# Patient Record
Sex: MALE | Race: WHITE | Employment: OTHER | URBAN - METROPOLITAN AREA
[De-identification: names, ages, dates, MRNs, and addresses within clinical notes are randomized per-mention and may not be internally consistent; named-entity substitution may affect disease eponyms.]

---

## 2022-06-20 ENCOUNTER — PROCEDURE VISIT (OUTPATIENT)
Dept: UROLOGY | Age: 87
End: 2022-06-20
Payer: OTHER GOVERNMENT

## 2022-06-20 DIAGNOSIS — R33.9 URINARY RETENTION: Primary | ICD-10-CM

## 2022-06-20 DIAGNOSIS — C61 PROSTATE CANCER (HCC): ICD-10-CM

## 2022-06-20 DIAGNOSIS — N31.2 NEUROGENIC BLADDER, FLACCID: ICD-10-CM

## 2022-06-20 PROCEDURE — 52000 CYSTOURETHROSCOPY: CPT | Performed by: UROLOGY

## 2022-06-20 PROCEDURE — 1123F ACP DISCUSS/DSCN MKR DOCD: CPT | Performed by: UROLOGY

## 2022-06-20 PROCEDURE — 99214 OFFICE O/P EST MOD 30 MIN: CPT | Performed by: UROLOGY

## 2022-06-20 RX ORDER — AMIODARONE HYDROCHLORIDE 200 MG/1
TABLET ORAL
COMMUNITY
Start: 2022-03-18

## 2022-06-20 RX ORDER — FAMOTIDINE 20 MG/1
20 TABLET, FILM COATED ORAL 2 TIMES DAILY
COMMUNITY

## 2022-06-20 RX ORDER — SACUBITRIL AND VALSARTAN 24; 26 MG/1; MG/1
1 TABLET, FILM COATED ORAL 2 TIMES DAILY
COMMUNITY

## 2022-06-20 RX ORDER — METOPROLOL SUCCINATE 50 MG/1
50 TABLET, EXTENDED RELEASE ORAL DAILY
COMMUNITY

## 2022-06-20 RX ORDER — B-COMPLEX WITH VITAMIN C
1 TABLET ORAL DAILY
COMMUNITY

## 2022-06-20 RX ORDER — POTASSIUM CHLORIDE 750 MG/1
10 TABLET, FILM COATED, EXTENDED RELEASE ORAL DAILY
COMMUNITY
Start: 2022-05-05

## 2022-06-20 RX ORDER — ASCORBIC ACID 500 MG
500 TABLET ORAL DAILY
COMMUNITY

## 2022-06-20 RX ORDER — TRAZODONE HYDROCHLORIDE 50 MG/1
25 TABLET ORAL
COMMUNITY

## 2022-06-20 RX ORDER — LEVOTHYROXINE SODIUM 0.03 MG/1
25 TABLET ORAL
COMMUNITY
Start: 2022-03-15

## 2022-06-20 RX ORDER — SPIRONOLACTONE 25 MG/1
12.5 TABLET ORAL DAILY
COMMUNITY
Start: 2022-06-08 | End: 2022-07-08

## 2022-06-20 RX ORDER — FERROUS SULFATE 325(65) MG
TABLET ORAL
COMMUNITY
Start: 2022-03-18

## 2022-06-20 RX ORDER — LISINOPRIL 5 MG/1
TABLET ORAL
COMMUNITY
Start: 2022-03-18

## 2022-06-20 NOTE — PROGRESS NOTES
[facility]  [address] [address] [address]  [phone]    [facility]  : 1935         HPI   82 y.o., male presents for cystoscopy     The patient has had a catheter in since April. He was seen in our office and given a trial of voiding but he had to return later that afternoon to have a Yousif catheter placed. The catheter itself has been changed out several times. There is a history of prostate cancer and apparently he had radiation as well as a TURP. The patient informs me that he has had multiple PSAs at the [facility] and these have been acceptable. He has not had any fever recently. Past Medical History:   Diagnosis Date    Hypertension     Prostate CA [facility])     Thyroid disease      No past surgical history on file.   Current Outpatient Medications   Medication Sig Dispense Refill    amiodarone (CORDARONE) 200 MG tablet TAKE TWO TABLETS BY MOUTH TWICE DAILY FOR SIX DAYS, THEN ONE TABLET TWICE DAILY FOR 7 DAYS, THEN ONE TABLET BY MOUTH EVERY DAY      apixaban (ELIQUIS) 2.5 MG TABS tablet Take 2.5 mg by mouth 2 times daily      ascorbic acid (VITAMIN C) 500 MG tablet Take 500 mg by mouth daily      Calcium Carbonate-Vitamin D (OYSTER SHELL CALCIUM/D) 500-200 MG-UNIT TABS Take 1 tablet by mouth daily      famotidine (PEPCID) 20 MG tablet Take 20 mg by mouth 2 times daily      FEROSUL 325 (65 Fe) MG tablet TAKE ONE TABLET BY MOUTH EVERY DAY FOR FOURTEEN DAYS      levothyroxine (SYNTHROID) 25 MCG tablet Take 25 mcg by mouth every morning (before breakfast)      lisinopril (PRINIVIL;ZESTRIL) 5 MG tablet TAKE ONE TABLET BY MOUTH EVERY DAY AS DIRECTED      metoprolol succinate (TOPROL XL) 50 MG extended release tablet Take 50 mg by mouth daily      potassium chloride (KLOR-CON) 10 MEQ extended release tablet Take 10 mEq by mouth daily      sacubitril-valsartan (ENTRESTO) 24-26 MG per tablet Take 1 tablet by mouth 2 times daily      spironolactone (ALDACTONE) 25 MG tablet Take 12.5 mg by mouth daily      traZODone (DESYREL) 50 MG tablet Take 25 mg by mouth       No current facility-administered medications for this visit. No Known Allergies  Social History     Socioeconomic History    Marital status:      Spouse name: Not on file    Number of children: Not on file    Years of education: Not on file    Highest education level: Not on file   Occupational History    Not on file   Tobacco Use    Smoking status: Former Smoker     Packs/day: 0.50    Smokeless tobacco: Never Used   Substance and Sexual Activity    Alcohol use: Not Currently    Drug use: Not on file    Sexual activity: Not on file   Other Topics Concern    Not on file   Social History Narrative    Not on file     Social Determinants of Health     Financial Resource Strain:     Difficulty of Paying Living Expenses: Not on file   Food Insecurity:     Worried About [address] [employer] in the Last Year: Not on file    Ran Out of Food in the Last Year: Not on file   Transportation Needs:     Lack of Transportation (Medical): Not on file    Lack of Transportation (Non-Medical):  Not on file   Physical Activity:     Days of Exercise per Week: Not on file    Minutes of Exercise per Session: Not on file   Stress:     Feeling of Stress : Not on file   Social Connections:     Frequency of Communication with Friends and Family: Not on file    Frequency of Social Gatherings with Friends and Family: Not on file    Attends [employer] Services: Not on file    Active Member of Clubs or Organizations: Not on file    Attends Club or Organization Meetings: Not on file    Marital Status: Not on file   Intimate Partner Violence:     Fear of Current or Ex-Partner: Not on file    Emotionally Abused: Not on file    Physically Abused: Not on file    Sexually Abused: Not on file   Housing Stability:     Unable to Pay for Housing in the Last Year: Not on file    Number of [place] in the Last Year: Not on file    Unstable Housing in the Last Year: Not on file     No family history on file. There were no vitals taken for this visit. Cystoscopy Procedure:     Procedure Room # 1   Scope ID: Disposable   Assistant: maru      All risks, benefits and alternatives were again reviewed with patient and he is willing to proceed at this time. His genital area was prepped and draped and a sterile field applied. 2% lidocaine jelly was injected in the the urethra and allowed to dwell for several minutes. A flexible cystoscope was then inserted into the urethral meatus and advanced under direct vision. The anterior and posterior urethra appeared normal in appearance. The prostatic fossa was status post TURP and open. There was a significant amount of erythema of the mucosa within the prostatic fossa and within the bladder itself consistent with long-term catheter use. At the ureteral orifices were seen in their normal orthotopic position. The cystoscope was then removed under direct vision. The patient tolerated the procedure well. Assessment and Plan    ICD-10-CM    1. Urinary retention  R33.9 CYSTOURETHROSCOPY (29997)   2. Prostate cancer ([facility] [address]  C61    3. 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 We will put his catheter back in and I will have him back to the main office in the near future to have the nurses teach him how to do self intermittent catheterization. The patient will perform self intermittent cath 4 times a day.

## 2022-06-30 ENCOUNTER — NURSE ONLY (OUTPATIENT)
Dept: UROLOGY | Age: 87
End: 2022-06-30

## 2022-06-30 DIAGNOSIS — R33.9 URINARY RETENTION: Primary | ICD-10-CM

## 2022-06-30 DIAGNOSIS — N31.2 NEUROGENIC BLADDER, FLACCID: ICD-10-CM

## 2022-06-30 NOTE — PROGRESS NOTES
Patient came in today for clean intermittent self catheterization education. Patient was shown a video explaining the basic techniques of CIC. They showed an adequate understanding of CIC technique and did perform it successfully in office prior to leaving. Patient was given a box of 14f Coloplast Speedicath Soft catheters, and an order will be placed to [facility] for delivery.

## 2022-07-11 ENCOUNTER — TELEPHONE (OUTPATIENT)
Dept: UROLOGY | Age: 87
End: 2022-07-11

## 2022-07-11 NOTE — TELEPHONE ENCOUNTER
Spoke with patient's daughter. More samples of catheters given to hold patient over until [address] completes the OV note for the prescription.

## 2022-08-02 ENCOUNTER — TELEPHONE (OUTPATIENT)
Dept: UROLOGY | Age: 87
End: 2022-08-02

## 2022-08-02 NOTE — TELEPHONE ENCOUNTER
Pt's daughter Magdalene Mclaughlins calling about prescription for his cath supplies. Fatemeh Yadav gave him some samples in early July. She was told if they have not heard anything to call back.

## 2022-09-13 ENCOUNTER — OFFICE VISIT (OUTPATIENT)
Dept: UROLOGY | Age: 87
End: 2022-09-13
Payer: OTHER GOVERNMENT

## 2022-09-13 DIAGNOSIS — N31.2 NEUROGENIC BLADDER, FLACCID: ICD-10-CM

## 2022-09-13 DIAGNOSIS — R33.9 URINARY RETENTION: Primary | ICD-10-CM

## 2022-09-13 DIAGNOSIS — C61 PROSTATE CANCER (HCC): ICD-10-CM

## 2022-09-13 PROCEDURE — 1123F ACP DISCUSS/DSCN MKR DOCD: CPT | Performed by: NURSE PRACTITIONER

## 2022-09-13 PROCEDURE — 99214 OFFICE O/P EST MOD 30 MIN: CPT | Performed by: NURSE PRACTITIONER

## 2022-09-13 ASSESSMENT — ENCOUNTER SYMPTOMS
NAUSEA: 0
BACK PAIN: 0

## 2022-09-13 NOTE — PROGRESS NOTES
[address], [address]  [phone]          Maira Ramirez  : 1935    Chief Complaint   Patient presents with    Urinary Retention          HPI     Maira Ramirez is a 82 y.o. male  Patient here after an ER visit. Patient has history of neurogenic flaccid bladder. He underwent a TURP hoping that this would help him empty the bladder. Fortunately he continued to have elevated PVRs. He has significant history of prostate cancer treated with radiation. As PSA blood test done at the [facility] they have been undetectable. At his last visit he was taught how to CIC. He reports that overall he has been doing well up until recently. 2 weeks ago he was unable to get the catheter in the bladder. He had little bit of bleeding and this frightened him so he went to the ER. They placed a permanent indwelling catheter. He is here today to discuss removing this. He is accompanied by his daughter. His daughter tells me that he was very anxious and nervous when he saw blood and could not get the catheter again. Past Medical History:   Diagnosis Date    Hypertension     Prostate CA ([address]     Thyroid disease      No past surgical history on file.   Current Outpatient Medications   Medication Sig Dispense Refill    amiodarone (CORDARONE) 200 MG tablet TAKE TWO TABLETS BY MOUTH TWICE DAILY FOR SIX DAYS, THEN ONE TABLET TWICE DAILY FOR 7 DAYS, THEN ONE TABLET BY MOUTH EVERY DAY      apixaban (ELIQUIS) 2.5 MG TABS tablet Take 2.5 mg by mouth 2 times daily      ascorbic acid (VITAMIN C) 500 MG tablet Take 500 mg by mouth daily      Calcium Carbonate-Vitamin D (OYSTER SHELL CALCIUM/D) 500-200 MG-UNIT TABS Take 1 tablet by mouth daily      famotidine (PEPCID) 20 MG tablet Take 20 mg by mouth 2 times daily      FEROSUL 325 (65 Fe) MG tablet TAKE ONE TABLET BY MOUTH EVERY DAY FOR FOURTEEN DAYS      levothyroxine (SYNTHROID) 25 MCG tablet Take 25 mcg by mouth every morning (before breakfast)      lisinopril (PRINIVIL;ZESTRIL) 5 MG tablet TAKE ONE TABLET BY MOUTH EVERY DAY AS DIRECTED      metoprolol succinate (TOPROL XL) 50 MG extended release tablet Take 50 mg by mouth daily      potassium chloride (KLOR-CON) 10 MEQ extended release tablet Take 10 mEq by mouth daily      sacubitril-valsartan (ENTRESTO) 24-26 MG per tablet Take 1 tablet by mouth 2 times daily      traZODone (DESYREL) 50 MG tablet Take 25 mg by mouth      spironolactone (ALDACTONE) 25 MG tablet Take 12.5 mg by mouth daily       No current facility-administered medications for this visit. No Known Allergies  Social History     Socioeconomic History    Marital status:      Spouse name: Not on file    Number of children: Not on file    Years of education: Not on file    Highest education level: Not on file   Occupational History    Not on file   Tobacco Use    Smoking status: Former     Packs/day: 0.50     Types: Cigarettes    Smokeless tobacco: Never   Substance and Sexual Activity    Alcohol use: Not Currently    Drug use: Not on file    Sexual activity: Not on file   Other Topics Concern    Not on file   Social History Narrative    Not on file     Social Determinants of Health     Financial Resource Strain: Not on file   Food Insecurity: Not on file   Transportation Needs: Not on file   Physical Activity: Not on file   Stress: Not on file   Social Connections: Not on file   Intimate Partner Violence: Not on file   Housing Stability: Not on file     No family history on file. Review of Systems  Constitutional:   Negative for fever. GI:  Negative for nausea. Musculoskeletal:  Negative for back pain.     PHYSICAL EXAM    GENERAL: No acute distress, Awake, Alert, Oriented X 3, Gait normal  ABDOMEN: soft, non tender, non-distended, positive bowel sounds, no organomegaly, no palpable masses, no guarding, no rebound tenderness  SKIN: No rash, no erythema, no lacerations or abrasions, no ecchymosis  MUSCULOSKELETAL - MAEW, no edema   -indwelling catheter is intact. Urine is ced in color. Assessment and Plan    ICD-10-CM    1. Urinary retention  R33.9       2. 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 Prostate cancer [facility])  C61         PLAN:  He would like to take the catheter out and continue CIC. He knows to do this 3-4 times a day to completely empty the bladder. I explained to him that if he has issues getting the catheter and to call our office and we will be glad to see him. He tells me at the time he went to the ER he was not uncomfortable and probably could have waited till morning to come in for us to assist him. He has been using them for 12 Romanian straight tip catheters. I will also give him samples of daily catheters to see if this helps him insert easier. He has a follow-up in a few weeks he will keep that follow-up. Abe Godinez, NP, APRN - NP  Dr. Colon is supervising physician today and he approves plan of care. Return for keep previosly arranged OV. Elements of this note have been dictated using speech recognition software. Although reviewed, errors of speech recognition may have occurred.